# Patient Record
Sex: FEMALE | Race: WHITE | NOT HISPANIC OR LATINO | URBAN - METROPOLITAN AREA
[De-identification: names, ages, dates, MRNs, and addresses within clinical notes are randomized per-mention and may not be internally consistent; named-entity substitution may affect disease eponyms.]

---

## 2022-12-14 ENCOUNTER — WEB ENCOUNTER (OUTPATIENT)
Dept: URBAN - METROPOLITAN AREA CLINIC 80 | Facility: CLINIC | Age: 27
End: 2022-12-14

## 2023-02-08 ENCOUNTER — WEB ENCOUNTER (OUTPATIENT)
Dept: URBAN - METROPOLITAN AREA CLINIC 80 | Facility: CLINIC | Age: 28
End: 2023-02-08

## 2023-02-09 ENCOUNTER — DASHBOARD ENCOUNTERS (OUTPATIENT)
Age: 28
End: 2023-02-09

## 2023-02-09 ENCOUNTER — LAB OUTSIDE AN ENCOUNTER (OUTPATIENT)
Dept: URBAN - METROPOLITAN AREA CLINIC 80 | Facility: CLINIC | Age: 28
End: 2023-02-09

## 2023-02-09 ENCOUNTER — OFFICE VISIT (OUTPATIENT)
Dept: URBAN - METROPOLITAN AREA CLINIC 80 | Facility: CLINIC | Age: 28
End: 2023-02-09
Payer: COMMERCIAL

## 2023-02-09 VITALS — WEIGHT: 213 LBS | BODY MASS INDEX: 34.23 KG/M2 | HEIGHT: 66 IN | TEMPERATURE: 97.3 F

## 2023-02-09 DIAGNOSIS — K76.0 FATTY LIVER: ICD-10-CM

## 2023-02-09 DIAGNOSIS — E66.9 OBESITY, UNSPECIFIED: ICD-10-CM

## 2023-02-09 DIAGNOSIS — R74.8 ELEVATED LIVER ENZYMES: ICD-10-CM

## 2023-02-09 PROBLEM — 414916001: Status: ACTIVE | Noted: 2023-02-09

## 2023-02-09 PROBLEM — 162864005: Status: ACTIVE | Noted: 2023-02-09

## 2023-02-09 PROCEDURE — 99203 OFFICE O/P NEW LOW 30 MIN: CPT | Performed by: INTERNAL MEDICINE

## 2023-02-09 RX ORDER — LEVOCETIRIZINE DIHYDROCHLORIDE 5 MG/1
TABLET ORAL
Qty: 0 | Refills: 0 | Status: ACTIVE | COMMUNITY
Start: 1900-01-01

## 2023-02-09 RX ORDER — CHLORHEXIDINE GLUCONATE 4 %
LIQUID (ML) TOPICAL
Qty: 0 | Refills: 0 | Status: ACTIVE | COMMUNITY
Start: 1900-01-01

## 2023-02-09 RX ORDER — FLUTICASONE PROPIONATE 50 UG/1
SPRAY, METERED NASAL
Qty: 0 | Refills: 0 | Status: ACTIVE | COMMUNITY
Start: 1900-01-01

## 2023-02-09 RX ORDER — AZELASTINE 137 UG/1
SPRAY 2 SPRAYS IN EACH NOSTRIL BY INTRANASAL ROUTE 2 TIMES PER DAY SPRAY, METERED NASAL 2
Qty: 1 | Refills: 0 | Status: ACTIVE | COMMUNITY
Start: 1900-01-01

## 2023-02-09 NOTE — HPI-TODAY'S VISIT:
Review visit 2019 query gluten sensitive  Recommended to have eval: fatty liver on US in 2020, and el lab tests.  Mildly echogenic on US  Rare EtOH. No binge.  Mom has fatty liver, HTN but no other insulin resistance.

## 2023-02-13 LAB
A/G RATIO: 1.6
ALBUMIN: 4.1
ALKALINE PHOSPHATASE: 45
ALT (SGPT): 42
ANA SCREEN, IFA: NEGATIVE
AST (SGOT): 72
BILIRUBIN, TOTAL: 0.3
BUN/CREATININE RATIO: (no result)
BUN: 9
CALCIUM: 9.4
CARBON DIOXIDE, TOTAL: 24
CHLORIDE: 101
CHOLESTEROL, TOTAL: 173
CREATININE: 0.6
EGFR: 125
FERRITIN, SERUM: 51
GLOBULIN, TOTAL: 2.6
GLUCOSE: 88
HDL CHOLESTEROL: 64
HEMOGLOBIN A1C: 4.9
HEPATITIS B CORE AB TOTAL: (no result)
HEPATITIS B SURFACE AB IMMUNITY, QN: <5
HEPATITIS B SURFACE ANTIGEN: (no result)
IRON BIND.CAP.(TIBC): 451
IRON SATURATION: 24
IRON: 109
LDL CHOLESTEROL: 84
Lab: 2.7
NON HDL CHOLESTEROL: 109
POTASSIUM: 4.4
PROTEIN, TOTAL: 6.7
SODIUM: 137
TG/HDL C: 2.5
TRIGLYCERIDES: 158

## 2023-03-08 ENCOUNTER — OFFICE VISIT (OUTPATIENT)
Dept: URBAN - METROPOLITAN AREA CLINIC 79 | Facility: CLINIC | Age: 28
End: 2023-03-08
Payer: COMMERCIAL

## 2023-03-08 DIAGNOSIS — K76.0 FATTY LIVER: ICD-10-CM

## 2023-03-08 DIAGNOSIS — R16.0 HEPATOMEGALY: ICD-10-CM

## 2023-03-08 PROCEDURE — 76700 US EXAM ABDOM COMPLETE: CPT | Performed by: INTERNAL MEDICINE

## 2023-03-08 RX ORDER — CHLORHEXIDINE GLUCONATE 4 %
LIQUID (ML) TOPICAL
Qty: 0 | Refills: 0 | Status: ACTIVE | COMMUNITY
Start: 1900-01-01

## 2023-03-08 RX ORDER — FLUTICASONE PROPIONATE 50 UG/1
SPRAY, METERED NASAL
Qty: 0 | Refills: 0 | Status: ACTIVE | COMMUNITY
Start: 1900-01-01

## 2023-03-08 RX ORDER — AZELASTINE 137 UG/1
SPRAY 2 SPRAYS IN EACH NOSTRIL BY INTRANASAL ROUTE 2 TIMES PER DAY SPRAY, METERED NASAL 2
Qty: 1 | Refills: 0 | Status: ACTIVE | COMMUNITY
Start: 1900-01-01

## 2023-03-08 RX ORDER — LEVOCETIRIZINE DIHYDROCHLORIDE 5 MG/1
TABLET ORAL
Qty: 0 | Refills: 0 | Status: ACTIVE | COMMUNITY
Start: 1900-01-01

## 2023-03-09 PROBLEM — 197321007: Status: ACTIVE | Noted: 2023-02-09

## 2023-03-15 ENCOUNTER — TELEPHONE ENCOUNTER (OUTPATIENT)
Dept: URBAN - METROPOLITAN AREA CLINIC 80 | Facility: CLINIC | Age: 28
End: 2023-03-15